# Patient Record
(demographics unavailable — no encounter records)

---

## 2024-11-15 NOTE — PHYSICAL EXAM
[Chaperone Present] : A chaperone was present in the examining room during all aspects of the physical examination [[id number]] : A chaperone was present during the pelvic exam. [Examination Of The Breasts] : a normal appearance [No Masses] : no breast masses were palpable [Labia Majora] : normal [Labia Minora] : normal [Normal] : normal [Uterine Adnexae] : normal [FreeTextEntry2] : puneet

## 2024-11-15 NOTE — HISTORY OF PRESENT ILLNESS
[Y] : Patient is sexually active [FreeTextEntry1] : 40-year-old presents visit for follow-up Depo-Provera shot.  Patient has no complaints doing well on the Depo.  Patient also requesting refill on her Valtrex which she is taking 500 mg daily for HSV prevention. [Mammogramdate] : 09/24 [BreastSonogramDate] : 09/24 [PapSmeardate] : 03/24 [TextBox_31] : hpv [BoneDensityDate] : 08/23 [TextBox_102] : Amenorrhea on Depo

## 2025-01-31 NOTE — PLAN
[FreeTextEntry1] : patient received depo Provera injection today for contraceptive management  patient will follow up for next scheduled injection call if any abdominal pain or bleeding   annual exam due 3/2025  pt verbalized understanding

## 2025-01-31 NOTE — COUNSELING
[Contraception/ Emergency Contraception/ Safe Sexual Practices] : contraception, emergency contraception, safe sexual practices [FreeTextEntry2] : keep next scheduled appointment for Depo shot

## 2025-01-31 NOTE — HISTORY OF PRESENT ILLNESS
[Patient reported mammogram was normal] : Patient reported mammogram was normal [Patient reported PAP Smear was normal] : Patient reported PAP Smear was normal [Patient reported bone density results were normal] : Patient reported bone density results were normal [LMP unknown] : LMP unknown [Mammogramdate] : 9/24 [PapSmeardate] : 03/24 [BoneDensityDate] : 08/23 [FreeTextEntry1] : pt on depo shot   no

## 2025-01-31 NOTE — PHYSICAL EXAM
[Chaperone Present] : A chaperone was present in the examining room during all aspects of the physical examination [[id number]] : A chaperone was present during the pelvic exam. [FreeTextEntry2] : bran

## 2025-05-01 NOTE — PHYSICAL EXAM
[Chaperoned Physical Exam] : A chaperone was present in the examining room during all aspects of the physical examination. [MA] : MA

## 2025-07-25 NOTE — HISTORY OF PRESENT ILLNESS
[FreeTextEntry1] : Patient is a 41 year-old who presents today for a gynecologic follow-up regarding Depo-provera surveillance. Patient has no complaints.  No menses while taking Depo-Provera. Medications: valtrex, depo-provera 150 im q-90 days,phetermine  37.7 mcg   Patients last sonogram and mammogram is due in September. Patients last bone densitometry is due in September.

## 2025-07-25 NOTE — PHYSICAL EXAM
[Chaperoned Physical Exam] : A chaperone was present in the examining room during all aspects of the physical examination. [MA] : MA [Appropriately responsive] : appropriately responsive [Alert] : alert [No Acute Distress] : no acute distress [Soft] : soft [Non-tender] : non-tender [Non-distended] : non-distended [No Mass] : no mass [Oriented x3] : oriented x3 [FreeTextEntry2] : JAVIER OKEEFE

## 2025-07-25 NOTE — PLAN
[FreeTextEntry1] : Patient presents today for Depo-Provera surveillance. Irregular bleeding and bleeding patterns and expectations with Depo-Provera reviewed with patient. Patient reassured regarding current bleeding pattern. Depo-Provera was given 150 mg IM x 1 and the patient is overall satisfied with Depo-Provera and wishes to continue. Safe sex reviewed with patient. Depo-Provera sent to pharmacy.  Patient has referrals for mammogram and sonogram and bone density. Patient will follow-up in 3 months for Depo-Provera contraceptive injection and exam. Patient urged to do STD bloods given in May I, Shelia Del Castillo, have acted as a scribe and documented the above information for Dr. Ignacia Limon on 07/25/2025.   All medical record entries made by the Scribe were at my, Dr. Ignacia Limon, direction and personally dictated by me on 07/25/2025 . I have reviewed the chart and agree that the record accurately reflects my personal performance of the history, physical exam, assessment and plan. I have also personally directed, reviewed, and agreed with the chart.